# Patient Record
Sex: MALE | Race: WHITE | NOT HISPANIC OR LATINO | Employment: UNEMPLOYED | URBAN - METROPOLITAN AREA
[De-identification: names, ages, dates, MRNs, and addresses within clinical notes are randomized per-mention and may not be internally consistent; named-entity substitution may affect disease eponyms.]

---

## 2024-08-21 ENCOUNTER — HOSPITAL ENCOUNTER (EMERGENCY)
Facility: HOSPITAL | Age: 39
Discharge: HOME | End: 2024-08-21
Payer: COMMERCIAL

## 2024-08-21 VITALS
DIASTOLIC BLOOD PRESSURE: 79 MMHG | HEIGHT: 71 IN | TEMPERATURE: 97.7 F | SYSTOLIC BLOOD PRESSURE: 143 MMHG | OXYGEN SATURATION: 98 % | BODY MASS INDEX: 22.4 KG/M2 | RESPIRATION RATE: 16 BRPM | HEART RATE: 103 BPM | WEIGHT: 160 LBS

## 2024-08-21 DIAGNOSIS — M54.30 SCIATICA, UNSPECIFIED LATERALITY: Primary | ICD-10-CM

## 2024-08-21 PROCEDURE — 99283 EMERGENCY DEPT VISIT LOW MDM: CPT

## 2024-08-21 RX ORDER — METHOCARBAMOL 500 MG/1
1000 TABLET, FILM COATED ORAL 4 TIMES DAILY PRN
Qty: 30 TABLET | Refills: 0 | Status: SHIPPED | OUTPATIENT
Start: 2024-08-21

## 2024-08-21 RX ORDER — ORPHENADRINE CITRATE 100 MG/1
100 TABLET, EXTENDED RELEASE ORAL ONCE
Status: DISCONTINUED | OUTPATIENT
Start: 2024-08-21 | End: 2024-08-21 | Stop reason: HOSPADM

## 2024-08-21 RX ORDER — LIDOCAINE 560 MG/1
2 PATCH PERCUTANEOUS; TOPICAL; TRANSDERMAL ONCE
Status: DISCONTINUED | OUTPATIENT
Start: 2024-08-21 | End: 2024-08-21 | Stop reason: HOSPADM

## 2024-08-21 ASSESSMENT — LIFESTYLE VARIABLES
TOTAL SCORE: 0
EVER HAD A DRINK FIRST THING IN THE MORNING TO STEADY YOUR NERVES TO GET RID OF A HANGOVER: NO
HAVE YOU EVER FELT YOU SHOULD CUT DOWN ON YOUR DRINKING: NO
EVER FELT BAD OR GUILTY ABOUT YOUR DRINKING: NO
HAVE PEOPLE ANNOYED YOU BY CRITICIZING YOUR DRINKING: NO

## 2024-08-21 ASSESSMENT — PAIN - FUNCTIONAL ASSESSMENT
PAIN_FUNCTIONAL_ASSESSMENT: 0-10
PAIN_FUNCTIONAL_ASSESSMENT: 0-10

## 2024-08-21 ASSESSMENT — COLUMBIA-SUICIDE SEVERITY RATING SCALE - C-SSRS
2. HAVE YOU ACTUALLY HAD ANY THOUGHTS OF KILLING YOURSELF?: NO
1. IN THE PAST MONTH, HAVE YOU WISHED YOU WERE DEAD OR WISHED YOU COULD GO TO SLEEP AND NOT WAKE UP?: NO
6. HAVE YOU EVER DONE ANYTHING, STARTED TO DO ANYTHING, OR PREPARED TO DO ANYTHING TO END YOUR LIFE?: NO

## 2024-08-21 ASSESSMENT — PAIN SCALES - GENERAL
PAINLEVEL_OUTOF10: 7
PAINLEVEL_OUTOF10: 6

## 2024-08-22 NOTE — ED PROVIDER NOTES
HPI   Chief Complaint   Patient presents with    Back Pain     Low back pain and pain radiating  down left leg states his feet are burning       This is a 38-year-old male with PMH low back pain radiating down both his legs states worsening of his pre-existing symptoms and burning in his bilateral feet. Denies anticoagulation. Denies falls, trauma, or heavy exertion. Denies saddle anesthesia. Denies bowel or bladder changes. Denies fevers, chills, history of IV drug use, history of malignancy. Denies weakness. Denies chest pain, shortness of breath, abdominal pain, urinary symptoms.       History provided by:  Patient   used: No            Patient History   History reviewed. No pertinent past medical history.  History reviewed. No pertinent surgical history.  No family history on file.  Social History     Tobacco Use    Smoking status: Not on file    Smokeless tobacco: Not on file   Substance Use Topics    Alcohol use: Not on file    Drug use: Not on file       Physical Exam   ED Triage Vitals [08/21/24 1045]   Temperature Heart Rate Respirations BP   36.5 °C (97.7 °F) (!) 103 16 143/79      Pulse Ox Temp src Heart Rate Source Patient Position   98 % -- Monitor --      BP Location FiO2 (%)     -- --       Physical Exam    Gen.: Vitals noted. No apparent distress. Afebrile.  Heart: Regular rate and rhythm. No murmur.  Lungs: Clear to auscultation bilaterally with good aeration and no adventitious breath sounds.   Abdomen: Soft, nontender   Back: There is no tenderness to palpation in the lumbar paraspinal musculature. No midline tenderness. Positive passive straight leg raise.  Extremities: Normal motor sensory, symmetric reflexes, strong equal peripheral pulses Neurovascularly intact throughout.  Skin: No rash on back  Neuro: No focal deficits    ED Course & MDM   Diagnoses as of 08/21/24 2011   Sciatica, unspecified laterality                 No data recorded     [place] Coma Scale Score: 15  (08/21/24 1046 : Ynes Al RN)                           Medical Decision Making  Patient is stable hemodynamically. Visibly non-toxic appearing in no apparent distress. Neurovascularly intact on exam. Patient has no red flag neurologic signs or symptoms either in history or on physical exam. Low suspicion for cauda equina/ cord compression at this time.  Not anticoagulated, no infectious symptoms such as fevers or chills.  I have low suspicion for epidural hematoma or abscess or osteomyelitis at this time.  No midline tenderness and no evidence or history of trauma.  Do not feel advanced imaging is indicated at this time.  Given prescription for Robaxin and advised OTC analgesia/anti-inflammatories as needed.  Instructed to return to the nearest ED if any concerns or new or worsening symptoms. Patient verbalized understanding and agreement with plan. Discharged in stable condition.      Disclaimer: This note was dictated using speech recognition software. An attempt at proofreading was made to minimize errors. Minor errors in transcription may be present. Please call if questions.        Procedure  Procedures     Syed Abrams PA-C  08/21/24 2013